# Patient Record
Sex: MALE | Race: BLACK OR AFRICAN AMERICAN | Employment: UNEMPLOYED | URBAN - METROPOLITAN AREA
[De-identification: names, ages, dates, MRNs, and addresses within clinical notes are randomized per-mention and may not be internally consistent; named-entity substitution may affect disease eponyms.]

---

## 2021-10-04 ENCOUNTER — VIRTUAL VISIT (OUTPATIENT)
Dept: FAMILY MEDICINE CLINIC | Age: 26
End: 2021-10-04

## 2021-10-04 DIAGNOSIS — M25.562 CHRONIC PAIN OF LEFT KNEE: ICD-10-CM

## 2021-10-04 DIAGNOSIS — G89.29 CHRONIC PAIN OF LEFT KNEE: ICD-10-CM

## 2021-10-04 DIAGNOSIS — R53.83 OTHER FATIGUE: Primary | ICD-10-CM

## 2021-10-04 DIAGNOSIS — Z87.09 HISTORY OF ASTHMA: ICD-10-CM

## 2021-10-04 PROCEDURE — 99204 OFFICE O/P NEW MOD 45 MIN: CPT | Performed by: STUDENT IN AN ORGANIZED HEALTH CARE EDUCATION/TRAINING PROGRAM

## 2021-10-04 NOTE — PROGRESS NOTES
Progress Note    he is a 23y.o. year old male who presents for evalution. Assessment/ Plan:   Diagnoses and all orders for this visit:    1. Other fatigue    2. Chronic pain of left knee  Assessment & Plan:  Discussed that anti-inflammatories were unlikely to be the cause of his knee pain. Suspect that altered mechanics due to feet pain and history of clubfoot as a cause of his knee pain. Recommend a course of anti-inflammatories, patient declines. Recommend physical therapy. Patient will communicate location for fax of order. Consider imaging and Ortho referral for persistent symptoms. Orders:  -     REFERRAL TO PHYSICAL THERAPY    3. History of asthma     Follow-up and Dispositions    · Return for Office visit for fatigue and abdominal pain with fasting labs. I have discussed the diagnosis with the patient and the intended plan as seen in the above orders. The patient has received an after-visit summary and questions were answered concerning future plans. Pt conveyed understanding of plan. Medication Side Effects and Warnings were discussed with patient      Jamarcus Philippe MD       Subjective:     Chief Complaint   Patient presents with    New Patient    Knee Pain     states that he has knee pain that increases the longer he sits. pt was recently incarcerated was taking ibuprofen for foot pain     Labs     would like to test for vitamin deficiency. Reports left knee pain that is worsening. 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 Took naproxen due to club foot pain, and knee pain started then. Front of knee is location of pain, but feels deep, like he can't stretch it. Worse with knee at 90 degree angle, 1.5 hours  Standing sometimes helps. Notes that it pops a lot.   Randomly  No issues walking, instability, radiation of pain, weakness, numbness  Has tried using numbing cream, wearing a knee brace which helped. Been wearing the brace for 3 months. Interested in knowing what vitamins he is deficient in  Reports energy is low, \"not as peppy\"    Reports stomach pains, like hunger pains, after eating a small meal.    Childhood asthma, no issues in 10 years. Reviewed PmHx, RxHx, FmHx, SocHx, AllgHx and updated and dated in the chart. Review of Systems - negative except as listed above in the HPI      Objective: There were no vitals filed for this visit. No current outpatient medications on file. No current facility-administered medications for this visit. Physical Exam  Vitals and nursing note reviewed. Constitutional:       General: He is not in acute distress. Appearance: Normal appearance. He is not ill-appearing, toxic-appearing or diaphoretic. HENT:      Head: Normocephalic and atraumatic. Eyes:      General: No scleral icterus. Right eye: No discharge. Left eye: No discharge. Conjunctiva/sclera: Conjunctivae normal.   Pulmonary:      Effort: Pulmonary effort is normal. No respiratory distress. Musculoskeletal:      Cervical back: No rigidity. Neurological:      Mental Status: He is alert. Psychiatric:         Mood and Affect: Mood normal.         Behavior: Behavior normal.         Thought Content: Thought content normal.         Judgment: Judgment normal.               I was in the office while conducting this encounter. Total Time: minutes: 11-20 minutes. Ruma Gomez is a 23 y.o. male being evaluated by a Virtual Visit (video visit) encounter to address concerns as mentioned above. A caregiver was present when appropriate. Due to this being a TeleHealth encounter (During TINSI-40 public health emergency), evaluation of the following organ systems was limited: Vitals/Constitutional/EENT/Resp/CV/GI//MS/Neuro/Skin/Heme-Lymph-Imm.   Pursuant to the emergency declaration under the [facility] [address]885 waiver authority and the [employer] Act, this Virtual Visit was conducted with patient's (and/or legal guardian's) consent, to reduce the risk of exposure to COVID-19 and provide necessary medical care. Services were provided through a video synchronous discussion virtually to substitute for in-person encounter. --Mariia Call MD on 10/4/2021 at 8:19 AM    An electronic signature was used to authenticate this note.

## 2021-10-04 NOTE — PROGRESS NOTES
Terrence Rivera is a 23 y.o. male , id x 2(name and ). Reviewed questionnaires, and  medications. Chief Complaint   Patient presents with    New Patient    Knee Pain     states that he has knee pain that increases the longer he sits. pt was recently incarcerated was taking ibuprofen for foot pain     Labs     would like to test for vitamin deficiency.         3 most recent PHQ Screens 2015   Little interest or pleasure in doing things Not at all   Feeling down, depressed, irritable, or hopeless Not at all   Total Score PHQ 2 0

## 2021-10-05 NOTE — ASSESSMENT & PLAN NOTE
Discussed that anti-inflammatories were unlikely to be the cause of his knee pain. Suspect that altered mechanics due to feet pain and history of clubfoot as a cause of his knee pain. Recommend a course of anti-inflammatories, patient declines. Recommend physical therapy. Patient will communicate location for fax of order. Consider imaging and Ortho referral for persistent symptoms.

## 2021-10-08 ENCOUNTER — OFFICE VISIT (OUTPATIENT)
Dept: FAMILY MEDICINE CLINIC | Age: 26
End: 2021-10-08
Payer: MEDICAID

## 2021-10-08 VITALS
BODY MASS INDEX: 21.06 KG/M2 | OXYGEN SATURATION: 98 % | HEART RATE: 58 BPM | DIASTOLIC BLOOD PRESSURE: 78 MMHG | WEIGHT: 169.4 LBS | RESPIRATION RATE: 14 BRPM | TEMPERATURE: 98.2 F | SYSTOLIC BLOOD PRESSURE: 120 MMHG | HEIGHT: 75 IN

## 2021-10-08 DIAGNOSIS — Z00.00 WELL ADULT EXAM: Primary | ICD-10-CM

## 2021-10-08 DIAGNOSIS — R53.83 FATIGUE, UNSPECIFIED TYPE: ICD-10-CM

## 2021-10-08 DIAGNOSIS — G89.29 CHRONIC PAIN OF LEFT KNEE: ICD-10-CM

## 2021-10-08 DIAGNOSIS — E55.9 VITAMIN D DEFICIENCY: ICD-10-CM

## 2021-10-08 DIAGNOSIS — K59.00 CONSTIPATION, UNSPECIFIED CONSTIPATION TYPE: ICD-10-CM

## 2021-10-08 DIAGNOSIS — M25.562 CHRONIC PAIN OF LEFT KNEE: ICD-10-CM

## 2021-10-08 LAB
25(OH)D3 SERPL-MCNC: 15.6 NG/ML (ref 30–100)
ALBUMIN SERPL-MCNC: 3.8 G/DL (ref 3.5–5)
ALBUMIN/GLOB SERPL: 1.3 {RATIO} (ref 1.1–2.2)
ALP SERPL-CCNC: 70 U/L (ref 45–117)
ALT SERPL-CCNC: 21 U/L (ref 12–78)
ANION GAP SERPL CALC-SCNC: 4 MMOL/L (ref 5–15)
AST SERPL-CCNC: 21 U/L (ref 15–37)
BILIRUB SERPL-MCNC: 0.5 MG/DL (ref 0.2–1)
BUN SERPL-MCNC: 8 MG/DL (ref 6–20)
BUN/CREAT SERPL: 8 (ref 12–20)
CALCIUM SERPL-MCNC: 9.1 MG/DL (ref 8.5–10.1)
CHLORIDE SERPL-SCNC: 109 MMOL/L (ref 97–108)
CO2 SERPL-SCNC: 27 MMOL/L (ref 21–32)
CREAT SERPL-MCNC: 1.06 MG/DL (ref 0.7–1.3)
ERYTHROCYTE [DISTWIDTH] IN BLOOD BY AUTOMATED COUNT: 12.7 % (ref 11.5–14.5)
GLOBULIN SER CALC-MCNC: 2.9 G/DL (ref 2–4)
GLUCOSE SERPL-MCNC: 83 MG/DL (ref 65–100)
HCT VFR BLD AUTO: 39.7 % (ref 36.6–50.3)
HGB BLD-MCNC: 13.2 G/DL (ref 12.1–17)
MCH RBC QN AUTO: 30.9 PG (ref 26–34)
MCHC RBC AUTO-ENTMCNC: 33.2 G/DL (ref 30–36.5)
MCV RBC AUTO: 93 FL (ref 80–99)
NRBC # BLD: 0 K/UL (ref 0–0.01)
NRBC BLD-RTO: 0 PER 100 WBC
PLATELET # BLD AUTO: 353 K/UL (ref 150–400)
PMV BLD AUTO: 9.4 FL (ref 8.9–12.9)
POTASSIUM SERPL-SCNC: 4.3 MMOL/L (ref 3.5–5.1)
PROT SERPL-MCNC: 6.7 G/DL (ref 6.4–8.2)
RBC # BLD AUTO: 4.27 M/UL (ref 4.1–5.7)
SODIUM SERPL-SCNC: 140 MMOL/L (ref 136–145)
TSH SERPL DL<=0.05 MIU/L-ACNC: 0.75 UIU/ML (ref 0.36–3.74)
WBC # BLD AUTO: 3.1 K/UL (ref 4.1–11.1)

## 2021-10-08 PROCEDURE — 99395 PREV VISIT EST AGE 18-39: CPT | Performed by: NURSE PRACTITIONER

## 2021-10-08 NOTE — PROGRESS NOTES
Camelia Olivarez is a 23 y.o. male    Chief Complaint   Patient presents with    Complete Physical       1. Have you been to the ER, urgent care clinic since your last visit? Hospitalized since your last visit? No    2. Have you seen or consulted any other health care providers outside of the [address] [place] [address] since your last visit? Include any pap smears or colon screening.  No

## 2021-10-08 NOTE — PROGRESS NOTES
Subjective:   Christianne TAYLOR Sorensen is a 23 y.o. male presenting for complete physical, evaluation of knee pain and fatigue. 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 ROS:  Feeling well. No dyspnea or chest pain on exertion. No abdominal pain, change in bowel habits, black or bloody stools. No urinary tract or prostatic symptoms. No neurological complaints. Specific concerns today: c/o chronic pain of the left knee for the past year. Notes pain is aggravated by flexing the knee but is fairly constant. Wears a brace at work and feels this helps somewhat. No swelling or redness noted. Notes remote traumatic injury where he hit the affected knee against the side of a truck but current symptoms did not begin for years after this. Has noted no instability or falls. Notes daily BM but notes small amount, does not feel bowels are fully evacuated with this. Already taking probiotic. No blood or mucus in stool. No abdominal distention or pain.     Patient Active Problem List   Diagnosis Code    Chronic pain of left knee M25.562, G89.29    Other fatigue R53.83    History of asthma Z87.09     Allergies   Allergen Reactions    Keflex [Cephalexin] Hives and Swelling    Keflex [Cephalexin] Rash     Past Medical History:   Diagnosis Date    Asthma     Other ill-defined conditions(799.89)     migraines    Other ill-defined conditions(799.89)     club foot     Past Surgical History:   Procedure Laterality Date    HX ORTHOPAEDIC      Club foot reconstruction; 16 yo was final surgery, 5 total surgeries     Family History   Problem Relation Age of Onset    Other Paternal Grandmother         brain aneurysm    Other Other         brain aneurysm, lupus, cancer, respiratory illness     Social History     Tobacco Use    Smoking status: Current Every Day Smoker     Packs/day: 0.25    Smokeless tobacco: Never Used   Substance Use Topics    Alcohol use: No     Comment: social        Lab Results   Component Value Date/Time    WBC 4.3 08/21/2010 11:32 AM    HGB 12.0 08/21/2010 11:32 AM    HCT 36.9 08/21/2010 11:32 AM    PLATELET 470 70/68/1626 11:32 AM    MCV 80.9 08/21/2010 11:32 AM     Lab Results   Component Value Date/Time    Glucose 109 08/21/2010 11:32 AM    Glucose (POC) 124 (H) 08/21/2010 11:13 AM    Creatinine 0.7 08/21/2010 11:32 AM      No results found for: CHOL, CHOLPOCT, HDL, LDL, LDLC, LDLCPOC, LDLCEXT, TRIGL, TGLPOCT, CHHD, CHHDX  Lab Results   Component Value Date/Time    ALT (SGPT) 28 08/21/2010 11:32 AM    Alk.  phosphatase 388 08/21/2010 11:32 AM    Bilirubin, total 0.4 08/21/2010 11:32 AM    Albumin 3.8 08/21/2010 11:32 AM    Protein, total 7.0 08/21/2010 11:32 AM    PLATELET 090 91/15/5903 11:32 AM     Lab Results   Component Value Date/Time    GFR est non-AA CANNOT BE CALCULATED 08/21/2010 11:32 AM    GFR est AA CANNOT BE CALCULATED 08/21/2010 11:32 AM    Creatinine 0.7 08/21/2010 11:32 AM    BUN 10 08/21/2010 11:32 AM    Sodium 140 08/21/2010 11:32 AM    Potassium 3.4 (L) 08/21/2010 11:32 AM    Chloride 103 08/21/2010 11:32 AM    CO2 28 08/21/2010 11:32 AM     No results found for: TSH, TSH2, TSH3, TSHP, TSHELE, TSHEXT, TT3, T3U, T3UP, FRT3, FT3, FT4, FT4P, T4, T4P, FT4T, TT7, TSHEXT      Objective:     Visit Vitals  /78 (BP 1 Location: Left upper arm, BP Patient Position: Sitting, BP Cuff Size: Small adult)   Pulse (!) 58   Temp 98.2 °F (36.8 °C)   Resp 14   Ht 6' 3\" (1.905 m)   Wt 169 lb 6.4 oz (76.8 kg)   SpO2 98%   BMI 21.17 kg/m²     Physical Examination: General appearance - alert, well appearing, and in no distress, oriented to person, place, and time and well hydrated  Mental status - normal mood, behavior, speech, dress, motor activity, and thought processes  Eyes - pupils equal and reactive, extraocular eye movements intact, sclera anicteric  Ears - bilateral TM's and external ear canals normal  Nose - normal and patent, no erythema, discharge or polyps  Mouth - mucous membranes moist, pharynx normal without lesions, dental hygiene good and tongue normal  Neck - supple, no significant adenopathy, thyroid exam: thyroid is normal in size without nodules or tenderness  Chest - clear to auscultation, no wheezes, rales or rhonchi, symmetric air entry  Heart - normal rate, regular rhythm, normal S1, S2, no murmurs, rubs, clicks or gallops, normal bilateral carotid upstroke without bruits, no JVD  Abdomen - soft, nontender, nondistended, no masses or organomegaly  bowel sounds normal  Neurological - alert, oriented, normal speech, no focal findings or movement disorder noted  Musculoskeletal - no joint tenderness, deformity or swelling, no muscular tenderness noted  Extremities - peripheral pulses normal, no pedal edema, no clubbing or cyanosis  Skin - normal coloration and turgor, no rashes, no suspicious skin lesions noted      Assessment/Plan:     Diagnoses and all orders for this visit:    1. Well adult exam  healthy adult male  limit alcohol consumption, stop smoking (advice and handout given), follow low fat diet, follow low salt diet. 2. Fatigue, unspecified type  Encouraged healthy diet, regular exercise  dwp 4 hours sleep is not enough, adults need 7-8 hours sleep nightly  Will eval for anemia, thyroid disease, vit d def, electrolyte, liver, kidney abnormalities  -     METABOLIC PANEL, COMPREHENSIVE; Future  -     CBC W/O DIFF; Future  -     TSH 3RD GENERATION; Future  -     VITAMIN D, 25 HYDROXY; Future    3. Chronic pain of left knee  Refer for further eval  -     REFERRAL TO ORTHOPEDICS    4. Constipation  Add daily fiber supplement  Increase water intake  Increase fruits and vegetables in diet    Follow-up and Dispositions    · Return in about 3 months (around 1/8/2022), or if symptoms worsen or fail to improve, for fatigue.        I have discussed the diagnosis with the patient and the intended plan as seen in the above orders. The patient has received an after-visit summary and questions were answered concerning future plans. Patient conveyed understanding of the plan at the time of the visit.     Thania Yates NP  10/8/2021

## 2021-10-08 NOTE — PATIENT INSTRUCTIONS
Fatigue: Care Instructions  Your Care Instructions     Fatigue is a feeling of tiredness, exhaustion, or lack of energy. You may feel fatigue because of too much or not enough activity. It can also come from stress, lack of sleep, boredom, and poor diet. Many medical problems, such as viral infections, can cause fatigue. Emotional problems, especially depression, are often the cause of fatigue. Fatigue is most often a symptom of another problem. Treatment for fatigue depends on the cause. For example, if you have fatigue because you have a certain health problem, treating this problem also treats your fatigue. If depression or anxiety is the cause, treatment may help. Follow-up care is a key part of your treatment and safety. Be sure to make and go to all appointments, and call your doctor if you are having problems. It's also a good idea to know your test results and keep a list of the medicines you take. How can you care for yourself at home? · Get regular exercise. But don't overdo it. Go back and forth between rest and exercise. · Get plenty of rest.  · Eat a healthy diet. Do not skip meals, especially breakfast.  · Reduce your use of caffeine, tobacco, and alcohol. Caffeine is most often found in coffee, tea, cola drinks, and chocolate. · Limit medicines that can cause fatigue. This includes tranquilizers and cold and allergy medicines. When should you call for help? Watch closely for changes in your health, and be sure to contact your doctor if:    · You have new symptoms such as fever or a rash.     · Your fatigue gets worse.     · You have been feeling down, depressed, or hopeless. Or you may have lost interest in things that you usually enjoy.     · You are not getting better as expected. Where can you learn more? Go to http://www.gray.com/  Enter [id number] in the search box to learn more about \"Fatigue: Care Instructions. \"  Current as of: July 1, 2021               Content Version: 13.0  © 5269-2417 [employer]. Care instructions adapted under license by [employer] (which disclaims liability or warranty for this information). If you have questions about a medical condition or this instruction, always ask your healthcare professional. [address] any warranty or liability for your use of this information. Well Visit, Ages 25 to 48: Care Instructions  Overview     Well visits can help you stay healthy. Your doctor has checked your overall health and may have suggested ways to take good care of yourself. Your doctor also may have recommended tests. At home, you can help prevent illness with healthy eating, regular exercise, and other steps. Follow-up care is a key part of your treatment and safety. Be sure to make and go to all appointments, and call your doctor if you are having problems. It's also a good idea to know your test results and keep a list of the medicines you take. How can you care for yourself at home? · Get screening tests that you and your doctor decide on. Screening helps find diseases before any symptoms appear. · Eat healthy foods. Choose fruits, vegetables, whole grains, protein, and low-fat dairy foods. Limit fat, especially saturated fat. Reduce salt in your diet. · Limit alcohol. If you are a man, have no more than 2 drinks a day or 14 drinks a week. If you are a woman, have no more than 1 drink a day or 7 drinks a week. · Get at least 30 minutes of physical activity on most days of the week. Walking is a good choice. You also may want to do other activities, such as running, swimming, cycling, or playing tennis or team sports. Discuss any changes in your exercise program with your doctor. · Reach and stay at a healthy weight. This will lower your risk for many problems, such as obesity, diabetes, heart disease, and high blood pressure.   · Do not smoke or allow others to smoke around you. If you need help quitting, talk to your doctor about stop-smoking programs and medicines. These can increase your chances of quitting for good. · Care for your mental health. It is easy to get weighed down by worry and stress. Learn strategies to manage stress, like deep breathing and mindfulness, and stay connected with your family and community. If you find you often feel sad or hopeless, talk with your doctor. Treatment can help. · Talk to your doctor about whether you have any risk factors for sexually transmitted infections (STIs). You can help prevent STIs if you wait to have sex with a new partner (or partners) until you've each been tested for STIs. It also helps if you use condoms (male or female condoms) and if you limit your sex partners to one person who only has sex with you. Vaccines are available for some STIs, such as HPV. · Use birth control if it's important to you to prevent pregnancy. Talk with your doctor about the choices available and what might be best for you. · If you think you may have a problem with alcohol or drug use, talk to your doctor. This includes prescription medicines (such as amphetamines and opioids) and illegal drugs (such as cocaine and methamphetamine). Your doctor can help you figure out what type of treatment is best for you. · Protect your skin from too much sun. When you're outdoors from 10 a.m. to 4 p.m., stay in the shade or cover up with clothing and a hat with a wide brim. Wear sunglasses that block UV rays. Even when it's cloudy, put broad-spectrum sunscreen (SPF 30 or higher) on any exposed skin. · See a dentist one or two times a year for checkups and to have your teeth cleaned. · Wear a seat belt in the car. When should you call for help? Watch closely for changes in your health, and be sure to contact your doctor if you have any problems or symptoms that concern you. Where can you learn more?   Go to http://www.gray.com/  Enter P072 in the search box to learn more about \"Well Visit, Ages 25 to 48: Care Instructions. \"  Current as of: February 11, 2021               Content Version: 13.0  © 8433-1137 [employer]. Care instructions adapted under license by [employer] (which disclaims liability or warranty for this information). If you have questions about a medical condition or this instruction, always ask your healthcare professional. [address] any warranty or liability for your use of this information. Stopping Smoking: Care Instructions  Your Care Instructions     Cigarette smokers crave the nicotine in cigarettes. Giving it up is much harder than simply changing a habit. Your body has to stop craving the nicotine. It is hard to quit, but you can do it. There are many tools that people use to quit smoking. You may find that combining tools works best for you. There are several steps to quitting. First you get ready to quit. Then you get support to help you. After that, you learn new skills and behaviors to become a nonsmoker. For many people, a necessary step is getting and using medicine. Your doctor will help you set up the plan that best meets your needs. You may want to attend a smoking cessation program to help you quit smoking. When you choose a program, look for one that has proven success. Ask your doctor for ideas. You will greatly increase your chances of success if you take medicine as well as get counseling or join a cessation program.  Some of the changes you feel when you first quit tobacco are uncomfortable. Your body will miss the nicotine at first, and you may feel short-tempered and grumpy. You may have trouble sleeping or concentrating. Medicine can help you deal with these symptoms. You may struggle with changing your smoking habits and rituals. The last step is the tricky one:  Be prepared for the smoking urge to continue for a time. This is a lot to deal with, but keep at it. You will feel better. Follow-up care is a key part of your treatment and safety. Be sure to make and go to all appointments, and call your doctor if you are having problems. It's also a good idea to know your test results and keep a list of the medicines you take. How can you care for yourself at home? · Ask your family, friends, and coworkers for support. You have a better chance of quitting if you have help and support. · Join a support group, such as Nicotine Anonymous, for people who are trying to quit smoking. · Consider signing up for a smoking cessation program, such as the American Lung Association's Freedom from Smoking program.  · Get text messaging support. Go to the website at www.smokefree. gov to sign up for the [facility] program.  · Set a quit date. Pick your date carefully so that it is not right in the middle of a big deadline or stressful time. Once you quit, do not even take a puff. Get rid of all ashtrays and lighters after your last cigarette. Clean your house and your clothes so that they do not smell of smoke. · Learn how to be a nonsmoker. Think about ways you can avoid those things that make you reach for a cigarette. ? Avoid situations that put you at greatest risk for smoking. For some people, it is hard to have a drink with friends without smoking. For others, they might skip a coffee break with coworkers who smoke. ? Change your daily routine. Take a different route to work or eat a meal in a different place. · Cut down on stress. Calm yourself or release tension by doing an activity you enjoy, such as reading a book, taking a hot bath, or gardening. · Talk to your doctor or pharmacist about nicotine replacement therapy, which replaces the nicotine in your body. You still get nicotine but you do not use tobacco. Nicotine replacement products help you slowly reduce the amount of nicotine you need.  These products come in several forms, many of them available over-the-counter:  ? Nicotine patches  ? Nicotine gum and lozenges  ? Nicotine inhaler  · Ask your doctor about bupropion (Wellbutrin) or varenicline (Chantix), which are prescription medicines. They do not contain nicotine. They help you by reducing withdrawal symptoms, such as stress and anxiety. · Some people find hypnosis, acupuncture, and massage helpful for ending the smoking habit. · Eat a healthy diet and get regular exercise. Having healthy habits will help your body move past its craving for nicotine. · Be prepared to keep trying. Most people are not successful the first few times they try to quit. Do not get mad at yourself if you smoke again. Make a list of things you learned and think about when you want to try again, such as next week, next month, or next year. Where can you learn more? Go to http://www.gray.com/  Enter [id number] in the search box to learn more about \"Stopping Smoking: Care Instructions. \"  Current as of: February 11, 2021               Content Version: 13.0  © 9416-2525 [employer]. Care instructions adapted under license by [employer] (which disclaims liability or warranty for this information). If you have questions about a medical condition or this instruction, always ask your healthcare professional. [address] any warranty or liability for your use of this information.

## 2021-10-17 RX ORDER — ERGOCALCIFEROL 1.25 MG/1
50000 CAPSULE ORAL
Qty: 12 CAPSULE | Refills: 0 | Status: SHIPPED | OUTPATIENT
Start: 2021-10-17 | End: 2022-01-03

## 2021-10-17 NOTE — PROGRESS NOTES
Vit D levels low- rx for weekly high dose replacement sent to pharmacy on record, RTC in 12 weeks for recheck of levels. Thyroid level is normal.  White blood cells below normal levels- this is often a normal variant for  americans. Recommend repeating this test in 3 months. Electrolytes, liver and kidney function looks good.

## 2022-03-19 PROBLEM — M25.562 CHRONIC PAIN OF LEFT KNEE: Status: ACTIVE | Noted: 2021-10-04

## 2022-03-19 PROBLEM — Z87.09 HISTORY OF ASTHMA: Status: ACTIVE | Noted: 2021-10-04

## 2022-03-19 PROBLEM — G89.29 CHRONIC PAIN OF LEFT KNEE: Status: ACTIVE | Noted: 2021-10-04

## 2022-03-19 PROBLEM — R53.83 OTHER FATIGUE: Status: ACTIVE | Noted: 2021-10-04
